# Patient Record
(demographics unavailable — no encounter records)

---

## 2017-11-15 NOTE — NUR
RN NOTES

CALLED DR ARREDONDO , DISCUSSED THAT PT HAS O DIET , PER MD START PT ON SOFT DIET , VERIFIED 
IF HE WANTS TO START PT ON ACCUCHECK AS PT HAS HISTORY OF DM , PER MD NO ACCUCHECK AS PT IS 
NOT DIABETIC , ORDERS CARRIED OUT

## 2017-11-15 NOTE — NUR
MS RN NOTES

RECEIVED PATIENT DIRECT ADMIT FROM DR ARREDONDO'S OFFICE , AOX1 DEMENTED Malagasy SPEAKING , 
NOT IN ACUTE DISTRESS , RESPIRATIONS EVEN AND UNLABORED , SPO2 % VIA 2LPM NC WITH 
HUMIDIFIER , FC INSERTED TOLERATED WELL NOTED WITH CLEAR YELLOW URINE , SKIN ASSESSMENT DONE 
, NO WOUND NOTED , SKIN IS INTACT , STARTED IV @ R WRIST # 20 PATENT AND INTACT SL , ALL 
NEEDS ATTENDED , BED ON LOW AND LOCKED POSITION ,SIDE RAILS X3 , CALL LIGHT WITHIN REACH , 
HOB @ 45 , ADMISSION ORDERS CARRIED OUT AND FAXED TO PHARMACY , WILL CONTINUE TO MONITOR

## 2017-11-15 NOTE — NUR
RN NOTES

SPOKE WITH DEVIKA FROM LAB , FOLLOWED UP REGARDING LABS AS ORDERED , AS ANOTHER PHLEB 
ATTEMPTED TO DRAW BLOOD AND PT IS HARD STICK , PER DEVIKA THEY WILL DO IT NOW

## 2017-11-15 NOTE — NUR
RN NOTES



RECEIVED PT AWAKE ON BED AOX1 CONFUSED AND FORGETFUL. NO ACUTE RESP DISTRESS. AFEBRILE. WITH 
O2  2LPM VIA NC SATING 97% FOOD GIVEN AS  DIET ORDERED.  IV SITE ON  RIGHT WRIST G 20 INTACT 
AND PATENT RUNNING WITH D5NS  WITH 20 MEQ KCL @ 70 CC/HR  INTACT AND PATENT.  F/C DRAINED 
WITH YELLOW COLOR URINE.  KEPT PT CLEAN AND DRY, BED LOCKED AND SECURED AND  WILL FREQ. 
MONITOR.

## 2017-11-15 NOTE — NUR
RN NOTES

FAXED ADMISSION ORDERS TO [facility] , ORDERS PLACED MANUALLY BY SOON RN BUT ORDERS FAXED TO [facility] 
PHARMACIST FOR VERIFICATION ,

## 2017-11-16 NOTE — NUR
RN NOTES



NOTED PT SITTING ON THE BED BLEEDING FROM THE LEFT HAND NOTED PATIENT IV LINE OUT LEFT HAND 
SUSTAINED WITH BIG BRUISE, PRESSURE FROM THE SITE RENDERED TO STOP THE BLOOD. APPLY COMPRESS 
TO SUBSIDE SWELLING AND TO STOP BLEEDING. EXPLAINED TO PATIENT WHAT HAPPEN  UNABLE TO 
VERBALIZED UNDERSTANDING. RE INSERT IV LINE ON  RIGHT HAND MITTENS RENDERED FOR PROTECTION.

## 2017-11-16 NOTE — NUR
RN NOTES



RECEIVED PT AWAKE ON BED. NO ACUTE RESP DISTRESS PURSED LIP BREATHING NOTED AT TIMES NO 
ACUTE RESP DISTRESS.  BREATH SOUND CLEAR.  PT IS AOX1 Frisian SPEAKING.  WITH O2 2LPM VIA 
NC  IV SITE ON  RIGHT HAND G22 WITH D5 NS  WITH  KCL 20 MEQ @ 70 CC/HR INTACT AND PATENT.  
KEPT PT CLEAN AND DRY. OFFLOADED EXT WITH PILLOWS. REDUCED PRESSURE TO BONY PROMINENCE AREA. 
WITH SITTER AT BEDSIDE.  WILL CONTINUE TO MONITOR.

## 2017-11-16 NOTE — NUR
RN NOTES

RECEIVED PT ON BED, A/Ox1, Sinhala  SPEAKING ,RESPIRATION EVEN AND UNLABORED , NO DISTRESS 
NOTE,  F/C DRAINED WITH YELLOW CLEAR COLOR URINE. D5NS WITH 20 KCL RUNNING AT 70CC/HR  VIA R 
HAND IV G 22 , SITE CDI,  L  HAND BRUISING NOTED, L HAND ELEVATED ON A PILLOW , ICE PACK 
APPLIED TO L HAND ,  WILL CONTINUE TO MONITOR , SR UP x3,  CALL LIGHT WITHIN EASY REACH, 
SITTER AT THE BED SIDE FOR SAFETY , WILL CONTINUE TO MONITOR PT  CLSOELY .

## 2017-11-16 NOTE — NUR
RN NOTES 

RESPIRATION EVEN AND UNLABORED, SITTER AT THE BEDSIDE FOR PT SAFETY, SR UP x3, CALL LIGHT 
WITHIN EASY REACH, NO SIGNIFICANT CHANGES NOTED ON THIS SHIFT , WILL ENDORSE TO NIGHT SHIFT 
FOR STEPHANIA.

## 2017-11-16 NOTE — NUR
RN NOTES



PATIENT ASLEEP WELL ON BED. NO ACUTE RESP DISTRESS. O2 2LPM VIA NC TOLERATED WELL. PURSED 
LIP BREATHING NOTED AT TIME. INCONTINENT CARE RENDERED. F/C DRAINED WITH YELLOW CLEAR COLOR 
URINE. BILATERAL WRIST RESTRAINT RENDERED DUE TO PT IS ABLE TO REMOVE MITTENS AND PULLED OUT 
MIDLINE FOR THE 3RD TIME. IVF ONGOING. ALL DUE MEDICINE TOLERATED WELL. KEPT PT CLEAN AND 
DRY. ENDORSED CONTINUITY OF CARE TO AM NURSE.

## 2017-11-17 NOTE — NUR
RN NOTES



PATIENT SLEEP FOR ABOUT 5 HOURS.  SITTER AT BEDSIDE NO SIGNIFICANT CHANGES NOTED. AFEBRILE. 
VS STABLE. ALL DUE MEDICINE TOLERATED BY MOUTH NO ASE NOTED. IVF ONGOING. KEPT PT CLEAN AND 
DRY WILL ENDORSED CONTINUITY OF CARE TO AM NURSE.

## 2017-11-17 NOTE — NUR
RN NOTES



RECEIVED PATIENT AWAKE IN BED WITH NO RESPIRATORY DISTRESS OR SHORTNESS OF BREATH. BREATHING 
EVEN AND UNLABORED. ALERT AND RESPONSIVE WITH CONFUSION. NO PHYSICAL MANIFESTATION OF PAIN 
OR DISCOMFORT. ON 2LPM VIA NASAL CANNULA TOLERATING WELL. FC PATENT AND INTACT DRAINING 
CLEAR YELLOW WITH NO FOUL ODOR URINE. WILL CONTINUE TO MONITOR.

## 2017-11-17 NOTE — NUR
RN NOTES:



PATIENT RECEIVED IN STABLE CONDITION ALERT AWAKE OX1, WITH PERIODS OF CONFUSION. ON OXYGEN 
2LPM NASAL CANNULA. BREATHING PATTERN REGULAR & UNLABORED. IV SITE INTACT, DRESSING 
DRY/INTACT, RUNNING WITH IV FLUIDS AS ORDERED BY MD. F/C INTACT, DRAINING WELL WITH GRAVITY. 
SAFETY MEASURES OBSERVED. SITTER AT BEDSIDE. CONTINUE TO MONITOR.

## 2017-11-18 NOTE — NUR
RN/ MS NOTES:



RECEIVED PT. IN BED A/O X 1. Marshallese SPEAKING. W/ 1:1 SITTER AT BEDSIDE.  NO S/S OF ANY 
RESPIRATORY DISTRESS NOTED. RA SAT. 97-99 %. HAS A MIDLINE TO RT. UPPER ARM W/DRESSING 
INTACT AND PATENT W/ NO S/S OF INFECTION/INFILTRATION NOTED. CALL LIGHT W/ REACH WILL 
CONTINUE TO MONITOR.

## 2017-11-18 NOTE — NUR
RN CLOSING NOTES



PT. IN BED A&OX1. BREATHING EVEN AND UNLABORED ON OXYGEN AT 2L/MIN VIA NASAL CANNULA. NO S/S 
OF ACUTE DISTRESS. RIGHT UPPER ARM MIDLINE INTACT. CAMPBELL CATHETER REMOVED 650 CC OF CLEAR 
AND YELLOW URINE OUTPUT. BED IS IN LOWEST, AND LOCKED POSITION. 2 SIDE RAILS UP, AND CALL 
LIGHT WITHIN REACH. WILL ENDORSE REPORT TO NURSE.

## 2017-11-18 NOTE — NUR
RN OPENING NOTES



1:1 SITTER AT BEDSIDE. RECEIVED PT. IN BED A&OX1. BREATHING EVEN AND UNLABORED ON OXYGEN AT 
2L/MIN VIA NASAL CANNULA. NO S/S OF ACUTE DISTRESS. RIGHT HAND IV ACCESS INTACT. CAMPBELL 
CATHETER IN PLACE. BED IS IN LOWEST, AND LOCKED POSITION. 2 SIDE RAILS UP, AND CALL LIGHT 
WITHIN REACH. ALL NEEDS MET AT THIS TIME.

## 2017-11-18 NOTE — NUR
RN NOTES



DIFFICULT TO INSERT NEW IV ACCESS. PER CHARGE NURSE PT. WILL HAVE MIDLINE INSERTION BY [facility] 
AT APPROX. AT 1700 TODAY.

## 2017-11-19 NOTE — NUR
RN/MS NOTES:



PT. IN BED SLEEPING W/ RESPIRATIONS EVEN AND UNLABORED. NO S/S OF FACIAL GRIMACES OR MOANING 
NOTED. W/ 1:1 SITTER AT BEDSIDE. ALL NEEDS MEET. REPORT GIVEN TO NEXT SHIFT NURSE FOR STEPHANIA.

## 2017-11-20 NOTE — NUR
CLOSING NOTES



PATIENT IS ALERT. ORIENTED X 1. VITAL SIGNS STABLE. ALL DUE MEDS GIVEN PER MD ORDER. KEPT 
CLEAN AND COMFORTABLE. RECIEVED IV ANTIBIOTICS GEORGETTE MIDLINE ON RIGHT UPPER ARM WITH NO SIGNS 
OF ADVERSE REACTION RELATED TO ATB USE. PATIENT PULLED OUT HEPLOCK ON RIGHT HAND DURING 
MORNING SHIFT. MID LINE ON RIGHT UPPER ARM REMAINS INTACT. ENDORSED TO NIGHT SHIFT FOR 
CONTINUITY OF CARE.

## 2017-11-20 NOTE — NUR
RN/MS NOTES:



PT. IN BED A&OX1. BREATHING EVEN AND UNLABORED ON OXYGEN AT 2L/MIN VIA NASAL CANNULA. NO S/S 
OF ACUTE DISTRESS. RIGHT UPPER ARM MIDLINE INTACT. CAMPBELL CATHETER REMOVED 1000 CC OF CLEAR 
AND YELLOW URINE OUTPUT. BED IS IN LOWEST, AND LOCKED POSITION. 2 SIDE RAILS UP, AND CALL 
LIGHT WITHIN REACH. REPORT GIVEN TO NEXT SHIFT NURSE FOR CONTINUE OF CARE.

## 2017-11-20 NOTE — NUR
RN INITIAL NOTE



RECEIVED PT IN NO ACUTE DISTRESS IN BED. PT IS A/O X 1 AND ABLE TO ANSWER QUESTIONS BY 
NODDING HEAD. PT IS ON 02 VIA NC@ 2LPM AND TOLERATING WELL WITH O2 SAT @ 95%. PT IS NOT C/O 
ANY SOB, DIFFICULTY BREATHING OR PAIN AT THIS TIME. PT HAS F/C THAT IS CLEAN DRY INTACT AND 
PATENT WITH YELLOW URINE DRAINING. PT HAS ISABEL MIDLINE THAT IS CLEAN DRY INTACT AND PATENT 
WITH NS @ TKO. BED IN LOW LOCK POSITION WITH RIALS UP X 2. CALL LIGHT WITHIN REACH AND ALL 
SAFETY MEASURES ENSURED AND CARRIED OUT. WILL CONTINUE TO MONITOR PT.

## 2017-11-21 NOTE — NUR
PT TRANSFERRED TO SNF 



DISCHARGE INSTRUCTIONS GIVEN AS ORDERED. ALL QUESTIONS AND CONCERNS ADDRESSED. PATIENT 
VERBALIZED UNDERSTANDING. IV REMAINS PER MD. CAMPBELL REMOVED. MEDICATION RECONCILIATION FORM 
COMPLETED AND COPY GIVEN TO PATIENT. REPORT GIVEN TO ELADIO AT [facility]. PATIENT 
TRANSPORTED WITH ALL PERSONAL BELONGINGS. NO DISTRESS NOTED AT TIME OF DEPARTURE.

## 2017-11-21 NOTE — NUR
RN CLOSING NOTE



PT REMAINS IN NO ACUTE DISTRESS IN BED. PT DID NOT HAVE ANY SIGNIFICANT CHANGE IN CONDITION 
DURING SHIFT. ALL NEEDS MET, ALL ORDERS CARRIED OUT. WILL ENDORSE CARE TO AM RN FOR 
CONTINUITY OF CARE.

## 2017-11-21 NOTE — NUR
RN NOTE

CALLED DR ARREDONDO TO REPORT HIGH GLUCOSE LEVEL FOR PT, GOT ORDER FOR LEVEMIR 10 UNITS DAILY 
AND ACHS MILD SLIDING SCALE. WILL CARRY OUT ORDERS AND MONITOR PT CLOSELY.

## 2018-08-21 NOTE — NUR
RN/ MS NOTES:



RECEIVED PT. IN BED A/O X 1. Kyrgyz SPEAKING. W/ 1:1 SITTER AT BEDSIDE.  NO S/S OF ANY 
RESPIRATORY DISTRESS NOTED. RA SAT. 97-99 %. HAS A MIDLINE TO RT. UPPER ARM W/DRESSING 
INTACT AND PATENT W/ NO S/S OF INFECTION/INFILTRATION NOTED. CALL LIGHT W/ REACH WILL 
CONTINUE TO MONITOR. Statement Selected

## 2022-09-22 NOTE — NUR
RN NOTES



PATIENT SLEEPING COMFORTABLY IN BED WITH NO DISTRESS NOTED. BREATHING EVEN AND UNLABORED. 
ALERT WITH CONFUSION. NO COMPLAINT OF PAIN OR DISCOMFORT. VITAL SIGNS WNL. NO SIGNIFICANT 
CHANGE OF CONDITION. WILL ENDORSE TO AM SHIFT FOR CONTINUITY OF CARE. Attending with